# Patient Record
Sex: MALE | URBAN - NONMETROPOLITAN AREA
[De-identification: names, ages, dates, MRNs, and addresses within clinical notes are randomized per-mention and may not be internally consistent; named-entity substitution may affect disease eponyms.]

---

## 2021-05-18 ENCOUNTER — NURSE TRIAGE (OUTPATIENT)
Dept: CALL CENTER | Facility: HOSPITAL | Age: 51
End: 2021-05-18

## 2021-05-18 NOTE — TELEPHONE ENCOUNTER
"Reviewed guideline with caller advises her Mr. Echeverria go to ED for evaluation of vomiting with severe abdominal pain and severe HA. Caller agrees to follow care advice.     Reason for Disposition  • Severe headache (e.g., excruciating) (Exception: similar to previous migraines)    Additional Information  • Negative: Shock suspected (e.g., cold/pale/clammy skin, too weak to stand, low BP, rapid pulse)  • Negative: Difficult to awaken or acting confused (e.g., disoriented, slurred speech)  • Negative: Sounds like a life-threatening emergency to the triager  • Negative: Vomiting occurs only while coughing  • Negative: [1] Pregnant < 20 Weeks AND [2] nausea/vomiting began in early pregnancy (i.e., 4-8 weeks pregnant)  • Negative: Chest pain  • Negative: Headache is main symptom  • Negative: Vomiting (or Nausea) in a cancer patient who is currently (or recently) receiving chemotherapy or radiation therapy, or cancer patient who has metastatic or end-stage cancer and is receiving palliative care  • Negative: [1] Vomiting AND [2] contains red blood or black (\"coffee ground\") material  (Exception: few red streaks in vomit that only happened once)  • Negative: Severe pain in one eye  • Negative: Recent head injury (within last 3 days)  • Negative: Recent abdominal injury (within last 3 days)  • Negative: [1] Insulin-dependent diabetes (Type I) AND [2] glucose > 400 mg/dl (22 mmol/l)  • Negative: [1] SEVERE vomiting (e.g., 6 or more times/day) AND [2] present > 8 hours  • Negative: [1] MODERATE vomiting (e.g., 3 - 5 times/day) AND [2] age > 60    Answer Assessment - Initial Assessment Questions  1. VOMITING SEVERITY: \"How many times have you vomited in the past 24 hours?\"      - MILD:  1 - 2 times/day     - MODERATE: 3 - 5 times/day, decreased oral intake without significant weight loss or symptoms of dehydration     - SEVERE: 6 or more times/day, vomits everything or nearly everything, with significant weight loss, symptoms " "of dehydration       Moderate  2. ONSET: \"When did the vomiting begin?\"       2 am  3. FLUIDS: \"What fluids or food have you vomited up today?\" \"Have you been able to keep any fluids down?\"      No vomiting in last 3 hours. Not drinking any fluids   4. ABDOMINAL PAIN: \"Are your having any abdominal pain?\" If yes : \"How bad is it and what does it feel like?\" (e.g., crampy, dull, intermittent, constant)       Yes, constant, 8/10  5. DIARRHEA: \"Is there any diarrhea?\" If so, ask: \"How many times today?\"       No   6. CONTACTS: \"Is there anyone else in the family with the same symptoms?\"       no  7. CAUSE: \"What do you think is causing your vomiting?\"      unknown  8. HYDRATION STATUS: \"Any signs of dehydration?\" (e.g., dry mouth [not only dry lips], too weak to stand) \"When did you last urinate?\"      Has urinated in last 2 hours.  9. OTHER SYMPTOMS: \"Do you have any other symptoms?\" (e.g., fever, headache, vertigo, vomiting blood or coffee grounds, recent head injury)       Temp. 96.7  10. PREGNANCY: \"Is there any chance you are pregnant?\" \"When was your last menstrual period?\"        na    Protocols used: VOMITING-ADULT-AH      "